# Patient Record
Sex: FEMALE | Race: WHITE | NOT HISPANIC OR LATINO | Employment: FULL TIME | URBAN - NONMETROPOLITAN AREA
[De-identification: names, ages, dates, MRNs, and addresses within clinical notes are randomized per-mention and may not be internally consistent; named-entity substitution may affect disease eponyms.]

---

## 2022-11-11 ENCOUNTER — OFFICE VISIT (OUTPATIENT)
Dept: FAMILY MEDICINE CLINIC | Facility: CLINIC | Age: 37
End: 2022-11-11

## 2022-11-11 VITALS
WEIGHT: 212 LBS | SYSTOLIC BLOOD PRESSURE: 138 MMHG | RESPIRATION RATE: 16 BRPM | HEIGHT: 64 IN | HEART RATE: 75 BPM | DIASTOLIC BLOOD PRESSURE: 89 MMHG | BODY MASS INDEX: 36.19 KG/M2 | OXYGEN SATURATION: 96 % | TEMPERATURE: 98.4 F

## 2022-11-11 DIAGNOSIS — B07.0 PLANTAR WART OF LEFT FOOT: Primary | ICD-10-CM

## 2022-11-11 DIAGNOSIS — M72.2 PLANTAR FASCIITIS OF LEFT FOOT: ICD-10-CM

## 2022-11-11 PROCEDURE — 99203 OFFICE O/P NEW LOW 30 MIN: CPT | Performed by: NURSE PRACTITIONER

## 2022-11-11 RX ORDER — PREDNISONE 20 MG/1
20 TABLET ORAL DAILY
Qty: 5 TABLET | Refills: 0 | Status: SHIPPED | OUTPATIENT
Start: 2022-11-11 | End: 2022-11-16

## 2022-11-11 NOTE — PROGRESS NOTES
"        Subjective     Chief Complaint   Patient presents with   • Foot Pain     Pt states that she has bilateral; foot pn L foot is causing more issue.  On feet for 11 hours  Burning pn in middle foot in the arch. Use to get injections.   No otc treatment        History of Present Illness    Left foot pain, 4th and 5th toes.  Hurting for 2 weeks.  Radiating into arch.  Hurt worse with weight bearing.  She has had steroid injections in this foot before.     Patient's PMR from outside medical facility reviewed and noted.    Review of Systems   Constitutional: Negative for fever.   Musculoskeletal: Positive for myalgias.   Skin: Negative for rash.        Otherwise complete ROS reviewed and negative except as mentioned in the HPI.    Past Medical History: History reviewed. No pertinent past medical history.  Past Surgical History:  Past Surgical History:   Procedure Laterality Date   • CHOLECYSTECTOMY       Social History:  reports that she has been smoking cigarettes. She has been smoking an average of .25 packs per day. She does not have any smokeless tobacco history on file. She reports that she does not currently use alcohol. She reports that she does not use drugs.    Family History: family history is not on file.      Allergies:  No Known Allergies  Medications:  Prior to Admission medications    Not on File         Objective     Vital Signs: /89   Pulse 75   Temp 98.4 °F (36.9 °C)   Resp 16   Ht 162.6 cm (64\")   Wt 96.2 kg (212 lb)   SpO2 96%   BMI 36.39 kg/m²   Physical Exam  Vitals and nursing note reviewed.   Constitutional:       Appearance: Normal appearance.   HENT:      Head: Normocephalic.      Nose: Nose normal.      Mouth/Throat:      Mouth: Mucous membranes are moist.   Eyes:      Extraocular Movements: Extraocular movements intact.      Pupils: Pupils are equal, round, and reactive to light.   Cardiovascular:      Pulses: Normal pulses.   Pulmonary:      Effort: Pulmonary effort is " normal.   Musculoskeletal:         General: Normal range of motion.      Cervical back: Normal range of motion.      Left foot: Normal range of motion.   Feet:      Left foot:      Skin integrity: Skin integrity normal.      Comments: Plantar wart noted on 4th toe of left foot.  Tenderness palpated on plantar fascia.    Skin:     General: Skin is warm and dry.   Neurological:      General: No focal deficit present.      Mental Status: She is alert and oriented to person, place, and time.   Psychiatric:         Mood and Affect: Mood normal.         Behavior: Behavior normal.         Thought Content: Thought content normal.         Judgment: Judgment normal.            Results Reviewed:  No results found for: GLUCOSE, BUN, CREATININE, NA, K, CL, CO2, CALCIUM, ALT, AST, WBC, HCT, PLT, CHOL, TRIG, HDL, LDL, LDLHDL, HGBA1C      Assessment / Plan     Assessment/Plan     Diagnoses and all orders for this visit:    1. Plantar wart of left foot (Primary)  -     Ambulatory Referral to Podiatry    2. Plantar fasciitis of left foot  -     predniSONE (DELTASONE) 20 MG tablet; Take 1 tablet by mouth Daily for 5 days.  Dispense: 5 tablet; Refill: 0         An After Visit Summary was printed and given to the patient at discharge.  Return if symptoms worsen or fail to improve.    I have discussed the patient results/orders and plan/recommendation with them at today's visit.      Reshma Kaufman, CHINTAN   11/11/2022